# Patient Record
Sex: MALE | Race: WHITE | NOT HISPANIC OR LATINO | Employment: FULL TIME | URBAN - METROPOLITAN AREA
[De-identification: names, ages, dates, MRNs, and addresses within clinical notes are randomized per-mention and may not be internally consistent; named-entity substitution may affect disease eponyms.]

---

## 2020-09-14 ENCOUNTER — APPOINTMENT (OUTPATIENT)
Dept: RADIOLOGY | Facility: IMAGING CENTER | Age: 30
End: 2020-09-14
Attending: PHYSICIAN ASSISTANT
Payer: COMMERCIAL

## 2020-09-14 ENCOUNTER — OFFICE VISIT (OUTPATIENT)
Dept: URGENT CARE | Facility: PHYSICIAN GROUP | Age: 30
End: 2020-09-14
Payer: COMMERCIAL

## 2020-09-14 VITALS
OXYGEN SATURATION: 95 % | BODY MASS INDEX: 25.22 KG/M2 | RESPIRATION RATE: 16 BRPM | SYSTOLIC BLOOD PRESSURE: 110 MMHG | DIASTOLIC BLOOD PRESSURE: 60 MMHG | WEIGHT: 186.2 LBS | HEART RATE: 58 BPM | TEMPERATURE: 98.2 F | HEIGHT: 72 IN

## 2020-09-14 DIAGNOSIS — M25.511 ACUTE PAIN OF RIGHT SHOULDER: ICD-10-CM

## 2020-09-14 DIAGNOSIS — G89.29 CHRONIC RIGHT SHOULDER PAIN: ICD-10-CM

## 2020-09-14 DIAGNOSIS — M25.511 CHRONIC RIGHT SHOULDER PAIN: ICD-10-CM

## 2020-09-14 DIAGNOSIS — M19.019 AC JOINT ARTHROPATHY: ICD-10-CM

## 2020-09-14 PROCEDURE — 99203 OFFICE O/P NEW LOW 30 MIN: CPT | Performed by: PHYSICIAN ASSISTANT

## 2020-09-14 PROCEDURE — 73030 X-RAY EXAM OF SHOULDER: CPT | Mod: TC,RT | Performed by: PHYSICIAN ASSISTANT

## 2020-09-14 RX ORDER — METHYLPREDNISOLONE 4 MG/1
TABLET ORAL
Qty: 21 TAB | Refills: 0 | Status: SHIPPED | OUTPATIENT
Start: 2020-09-14 | End: 2020-09-14

## 2020-09-15 ASSESSMENT — ENCOUNTER SYMPTOMS
EYES NEGATIVE: 1
GASTROINTESTINAL NEGATIVE: 1
SHORTNESS OF BREATH: 0
COUGH: 0
SENSORY CHANGE: 0
CHILLS: 0
FOCAL WEAKNESS: 0
TINGLING: 0
FEVER: 0
BACK PAIN: 0

## 2020-09-15 NOTE — PROGRESS NOTES
Subjective:   Destin Fritz is a 30 y.o. male who presents for Shoulder Pain (R shoulder pain, constant dull pain, sharp pain when moving, limited ROM, x4 months )      Shoulder Pain  Pertinent negatives include no chest pain, chills, coughing or fever.   Patient is a 30-year-old male who has had intermittent and waxing and waning right shoulder pain for the past 4 months.  He states increasing in severity and recurring episodes for the past several weeks.  He states the pain ranges from mild to moderate from a dull pain to a sharp pain exacerbated with movements.  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  He also occasionally lifts weights and does push-ups for exercise.  Pain is primarily located over AC joint as well as anterior and lateral shoulder joint.  He takes NSAIDs and ice application as needed with some relief.  He otherwise denies any history of obvious injury or trauma.  Denies any numbness, tingling, weakness.  Denies any chest pain, shortness of breath, sore throat, cough.     Review of Systems   Constitutional: Negative for chills and fever.   HENT: Negative.    Eyes: Negative.    Respiratory: Negative for cough and shortness of breath.    Cardiovascular: Negative for chest pain.   Gastrointestinal: Negative.    Musculoskeletal: Negative for back pain.        Right shoulder pain    Skin: Negative.    Neurological: Negative for tingling, sensory change and focal weakness.       Medications:    • This patient does not have an active medication from one of the medication groupers.    Allergies: Patient has no known allergies.    Problem List: Destin Fritz does not have a problem list on file.    Surgical History:  No past surgical history on file.    Past Social Hx: Destin Fritz  reports that he has never smoked. He has quit using smokeless tobacco.  His smokeless tobacco use included chew. He reports current alcohol use.     Past Family Hx:  Destin Fritz family history is not on file.      Problem list, medications, and allergies reviewed by myself today in Epic.     Objective:     /60 (BP Location: Left arm, Patient Position: Sitting, BP Cuff Size: Adult)   Pulse (!) 58   Temp 36.8 °C (98.2 °F) (Temporal)   Resp 16   Ht 1.829 m (6')   Wt 84.5 kg (186 lb 3.2 oz)   SpO2 95%   BMI 25.25 kg/m²     Physical Exam  Vitals signs reviewed.   Constitutional:       General: He is not in acute distress.     Appearance: Normal appearance. He is not ill-appearing or toxic-appearing.   HENT:      Head: Normocephalic and atraumatic.      Mouth/Throat:      Mouth: Mucous membranes are moist.      Pharynx: Oropharynx is clear. No oropharyngeal exudate or posterior oropharyngeal erythema.   Eyes:      Conjunctiva/sclera: Conjunctivae normal.      Pupils: Pupils are equal, round, and reactive to light.   Neck:      Musculoskeletal: Normal range of motion and neck supple. No neck rigidity or muscular tenderness.   Cardiovascular:      Rate and Rhythm: Normal rate and regular rhythm.      Heart sounds: Normal heart sounds.   Pulmonary:      Effort: Pulmonary effort is normal. No respiratory distress.      Breath sounds: Normal breath sounds. No wheezing, rhonchi or rales.   Musculoskeletal:      Comments: Right shoulder: Full active and passive range of motion at the shoulder joint and elbow joint.  Strength is 5 out of 5 to flexion, extension, abduction, and adduction, external and internal rotation.   There is positive tenderness to palpation over the AC joint as well as anterior and lateral shoulder joint.  No obvious AC joint separation on exam.  There is no clicking, erythema, edema, ecchymosis, or obvious deformity, crepitus.  Normal shoulder contour.   Negative empty can test.  Neurovascularly intact distally.  Normal pulses  Normal DTRs.   Lymphadenopathy:      Cervical: No cervical adenopathy.   Skin:     General: Skin is warm and dry.   Neurological:      General: No focal deficit present.       Mental Status: He is alert and oriented to person, place, and time.   Psychiatric:         Mood and Affect: Mood normal.         Behavior: Behavior normal.         Thought Content: Thought content normal.       DX: R Shoulder.   COMPARISON: None     FINDINGS:     No acute fracture or dislocation.  Lucencies in the right distal clavicle adjacent to the AC joint        IMPRESSION:        Lucencies in the right distal clavicle adjacent to the AC joint could relate to degenerative change or distal osteolysis (weightlifter).    Assessment/Plan:     Diagnosis and associated orders:     1. Chronic right shoulder pain  DX-SHOULDER 2+ RIGHT    REFERRAL TO SPORTS MEDICINE    DISCONTINUED: methylPREDNISolone (MEDROL DOSEPAK) 4 MG Tablet Therapy Pack   2. AC joint arthropathy  DISCONTINUED: methylPREDNISolone (MEDROL DOSEPAK) 4 MG Tablet Therapy Pack      Comments/MDM:     • Results per radiologist interpretation above. I agree with radiologist.   • Discussed with patient findings above.   • Voltaren gel OTC per 's instructions.  • RICE, Ibuprofen and/or Tylenol, Massage.   • Avoid strenuous activity or lifting until pain resolves  • Due to chronicity and abnormal x-ray findings, referral to sports medicine for further evaluation and treatment.       Red flags discussed  Supportive care, differential diagnoses, and indications for immediate follow-up discussed with patient.    Pathogenesis of diagnosis discussed including typical length and natural progression. Patient expresses understanding and agrees to plan.    Advised the patient to follow-up with the primary care physician for recheck, reevaluation, and consideration of further management.    Please note that this dictation was created using voice recognition software. I have made a reasonable attempt to correct obvious errors, but I expect that there are errors of grammar and possibly content that I did not discover before finalizing the note.    This note  was electronically signed by Joshua Ramirez PA-C

## 2020-09-15 NOTE — PATIENT INSTRUCTIONS
Shoulder Pain  Many things can cause shoulder pain, including:  · An injury.  · Moving the shoulder in the same way again and again (overuse).  · Joint pain (arthritis).  Pain can come from:  · Swelling and irritation (inflammation) of any part of the shoulder.  · An injury to the shoulder joint.  · An injury to:  ? Tissues that connect muscle to bone (tendons).  ? Tissues that connect bones to each other (ligaments).  ? Bones.  Follow these instructions at home:  Watch for changes in your symptoms. Let your doctor know about them. Follow these instructions to help with your pain.  If you have a sling:  · Wear the sling as told by your doctor. Remove it only as told by your doctor.  · Loosen the sling if your fingers:  ? Tingle.  ? Become numb.  ? Turn cold and blue.  · Keep the sling clean.  · If the sling is not waterproof:  ? Do not let it get wet.  ? Take the sling off when you shower or bathe.  Managing pain, stiffness, and swelling    · If told, put ice on the painful area:  ? Put ice in a plastic bag.  ? Place a towel between your skin and the bag.  ? Leave the ice on for 20 minutes, 2-3 times a day. Stop putting ice on if it does not help with the pain.  · Squeeze a soft ball or a foam pad as much as possible. This prevents swelling in the shoulder. It also helps to strengthen the arm.  General instructions  · Take over-the-counter and prescription medicines only as told by your doctor.  · Keep all follow-up visits as told by your doctor. This is important.  Contact a doctor if:  · Your pain gets worse.  · Medicine does not help your pain.  · You have new pain in your arm, hand, or fingers.  Get help right away if:  · Your arm, hand, or fingers:  ? Tingle.  ? Are numb.  ? Are swollen.  ? Are painful.  ? Turn white or blue.  Summary  · Shoulder pain can be caused by many things. These include injury, moving the shoulder in the same away again and again, and joint pain.  · Watch for changes in your symptoms.  Let your doctor know about them.  · This condition may be treated with a sling, ice, and pain medicine.  · Contact your doctor if the pain gets worse or you have new pain. Get help right away if your arm, hand, or fingers tingle or get numb, swollen, or painful.  · Keep all follow-up visits as told by your doctor. This is important.  This information is not intended to replace advice given to you by your health care provider. Make sure you discuss any questions you have with your health care provider.  Document Released: 06/05/2009 Document Revised: 07/02/2019 Document Reviewed: 07/02/2019  [employer] Patient Education © 2020 [employer]  Acromioclavicular Injuries  The acromioclavicular (AC) joint is the joint in the shoulder. There are many bands of tissue (ligaments) that surround the AC bones and joints. These bands of tissue can tear, which can lead to sprains and separations. The bones of the AC joint can also break (fracture).   HOME CARE   · Put ice on the injured area.  ¨ Put ice in a plastic bag.  ¨ Place a towel between your skin and the bag.  ¨ Leave the ice on for 15-20 minutes, 03-04 times a day.  · Wear your sling as told by your doctor. Remove the sling before showering and bathing. Keep the shoulder in the same place as when the sling is on. Do not lift the arm.  · Gently tighten your figure-eight splint (if applied) every day. Tighten it enough to keep the shoulders held back. There should be room to place your finger between your body and the strap. Loosen the splint right away if you lose feeling (numbness) or have tingling in your hands.  · Only take medicine as told by your doctor.  · Keep all follow-up visits with your doctor.  GET HELP RIGHT AWAY IF:   · Your medicine does not help your pain.  · You have more puffiness (swelling) or your bruising gets worse rather than better.  · You were unable to follow up as told by your doctor.  · You have tingling or lose even more feeling in your arm,  forearm, or hand.  · Your arm is cold or pale.  · You have more pain in the hand, forearm, or fingers.  MAKE SURE YOU:   · Understand these instructions.  · Will watch your condition.  · Will get help right away if you are not doing well or get worse.     This information is not intended to replace advice given to you by your health care provider. Make sure you discuss any questions you have with your health care provider.     Document Released: 06/07/2011 Document Revised: 03/11/2013 Document Reviewed: 06/07/2011  [employer] Patient Education ©2016 [employer]

## 2020-09-25 ENCOUNTER — OFFICE VISIT (OUTPATIENT)
Dept: MEDICAL GROUP | Facility: CLINIC | Age: 30
End: 2020-09-25
Payer: COMMERCIAL

## 2020-09-25 VITALS
OXYGEN SATURATION: 98 % | SYSTOLIC BLOOD PRESSURE: 116 MMHG | HEART RATE: 78 BPM | BODY MASS INDEX: 25.22 KG/M2 | WEIGHT: 186.2 LBS | HEIGHT: 72 IN | TEMPERATURE: 98.5 F | DIASTOLIC BLOOD PRESSURE: 72 MMHG | RESPIRATION RATE: 16 BRPM

## 2020-09-25 DIAGNOSIS — M25.811 SHOULDER IMPINGEMENT, RIGHT: ICD-10-CM

## 2020-09-25 PROCEDURE — 99203 OFFICE O/P NEW LOW 30 MIN: CPT | Performed by: FAMILY MEDICINE

## 2020-09-25 NOTE — PROGRESS NOTES
CHIEF COMPLAINT:  Destin Obando male presenting at the request of Joshua Ramirez PA-C  for evaluation of Shoulder pain.     Destin Obando is complaining of right shoulder pain  Started at [facility] and worsened over the past 2 yrs, since 2018  Pain is at the deltoid region  Quality is aching, sharp  Pain is Non-radiating  Aggravated by movement and overhead activity  Improved with  rest and massage   no prior problems with this shoulder in the past  Prior Treatments: seen at   Prior studies: X-Ray   Medications tried for pain include: naproxen (OTC)  Mechanical Symptom history: No Locking and Popping which is painful, crunching     and lifts weights    REVIEW OF SYSTEMS  No Nausea, No Vomiting, No Chest Pain, No Shortness of Breath, No Dizziness, No Headache    PAST MEDICAL HISTORY:   History reviewed. No pertinent past medical history.    PMH:  has no past medical history on file.  MEDS: No current outpatient medications on file.  ALLERGIES: No Known Allergies  SURGHX: No past surgical history on file.  SOCHX:  reports that he has never smoked. He has quit using smokeless tobacco.  His smokeless tobacco use included chew. He reports current alcohol use.  FH: Family history was reviewed, no pertinent findings to report     PHYSICAL EXAM:  /72 (BP Location: Left arm, Patient Position: Sitting, BP Cuff Size: Adult)   Pulse 78   Temp 36.9 °C (98.5 °F) (Temporal)   Resp 16   Ht 1.829 m (6')   Wt 84.5 kg (186 lb 3.2 oz)   SpO2 98%   BMI 25.25 kg/m²      well-developed, well-nourished in no apparent distress, alert and oriented x 3.  Gait: normal    Cervical spine:  Range of motion Intact and Slightly limited with Lateral rotation  Spurling's testing is NEGATIVE but there is some local cervical spine discomfort  Cervical spine tenderness NEGATIVE    Strength testing:     Deltoid, bilateral 5/5  Bicep, bilateral 5/5  Tricep, bilateral 5/5  Wrist Extension, bilateral 5/5  Wrist Flexion,  bilateral 5/5  Finger Abduction, bilateral 5/5    Sensation:  DECREASED on the right at the level of C5  and DECREASED on the right at the level of C6        Reflexes:   Biceps: R 2+/L 2+  Triceps: R 2+/L  2+  Brachial radialis R 2+/L  2+  Sheets's testing is NEGATIVE  The arms are otherwise neurovascularly intact     Shoulder Exam:    RIGHT Shoulder:     Range of motion INTACT  Tenderness: Non-tender  Empty Can Testing 5/5  Internal Rotation 5/5  External Rotation 5/5  Lift Off Testing 5/5  Impingement testing Yao  POSITIVE  Neer's testing POSITIVE  Apprehension testing POSITIVE  Relocation testing POSITIVE  Chavis's Testing POSITIVE  Grind Testing POSITIVE    LEFT Shoulder:  No visible swelling   Range of motion INTACT  Tenderness: Non-tender  Empty Can Testing 5/5  Internal Rotation 5/5  External Rotation 5/5  Lift Off Testing 5/5  Impingement testing Yao  NEGATIVE  Neer's testing NEGATIVE  Apprehension testing POSITIVE  Relocation testing NEGATIVE  Chavis's Testing NEGATIVE  Grind Testing NEGATIVE    Additional Findings: None    1. Shoulder impingement, right  REFERRAL TO PHYSICAL THERAPY Reason for Therapy: Eval/Treat/Report     Started at [facility] and worsened over the past 2 yrs, since 2018  Pain is at the deltoid region    Referral for formal physical therapy for cervical spine strengthening, periscapular and shoulder strengthening  [facility]    Home exercise program provided    No follow-ups on file.        9/14/2020 7:22 PM     HISTORY/REASON FOR EXAM:  Atraumatic Pain/Swelling/Deformity  Right shoulder pain for 4 months     TECHNIQUE/EXAM DESCRIPTION AND NUMBER OF VIEWS:  3 views of the RIGHT shoulder.     COMPARISON: None     FINDINGS:     No acute fracture or dislocation.  Lucencies in the right distal clavicle adjacent to the AC joint        IMPRESSION:        Lucencies in the right distal clavicle adjacent to the AC joint could relate to degenerative change or distal osteolysis (weightlifter).         taken here and reviewed by me    Thank you Joshua Ramirez PA-C for allowing me to participate in caring for your patient.